# Patient Record
Sex: MALE | Race: WHITE
[De-identification: names, ages, dates, MRNs, and addresses within clinical notes are randomized per-mention and may not be internally consistent; named-entity substitution may affect disease eponyms.]

---

## 2019-11-27 ENCOUNTER — HOSPITAL ENCOUNTER (OUTPATIENT)
Dept: HOSPITAL 72 - SUR | Age: 74
Discharge: HOME | End: 2019-11-27
Payer: MEDICARE

## 2019-11-27 VITALS — SYSTOLIC BLOOD PRESSURE: 135 MMHG | DIASTOLIC BLOOD PRESSURE: 61 MMHG

## 2019-11-27 VITALS — DIASTOLIC BLOOD PRESSURE: 65 MMHG | SYSTOLIC BLOOD PRESSURE: 129 MMHG

## 2019-11-27 VITALS — SYSTOLIC BLOOD PRESSURE: 127 MMHG | DIASTOLIC BLOOD PRESSURE: 56 MMHG

## 2019-11-27 VITALS — DIASTOLIC BLOOD PRESSURE: 52 MMHG | SYSTOLIC BLOOD PRESSURE: 130 MMHG

## 2019-11-27 VITALS — HEIGHT: 63 IN | WEIGHT: 216 LBS | BODY MASS INDEX: 38.27 KG/M2

## 2019-11-27 VITALS — DIASTOLIC BLOOD PRESSURE: 58 MMHG | SYSTOLIC BLOOD PRESSURE: 118 MMHG

## 2019-11-27 VITALS — DIASTOLIC BLOOD PRESSURE: 58 MMHG | SYSTOLIC BLOOD PRESSURE: 136 MMHG

## 2019-11-27 VITALS — DIASTOLIC BLOOD PRESSURE: 58 MMHG | SYSTOLIC BLOOD PRESSURE: 120 MMHG

## 2019-11-27 VITALS — SYSTOLIC BLOOD PRESSURE: 122 MMHG | DIASTOLIC BLOOD PRESSURE: 71 MMHG

## 2019-11-27 VITALS — DIASTOLIC BLOOD PRESSURE: 82 MMHG | SYSTOLIC BLOOD PRESSURE: 117 MMHG

## 2019-11-27 DIAGNOSIS — I48.91: ICD-10-CM

## 2019-11-27 DIAGNOSIS — H25.811: Primary | ICD-10-CM

## 2019-11-27 DIAGNOSIS — E66.9: ICD-10-CM

## 2019-11-27 DIAGNOSIS — G47.33: ICD-10-CM

## 2019-11-27 DIAGNOSIS — I10: ICD-10-CM

## 2019-11-27 LAB
ADD MANUAL DIFF: NO
ANION GAP SERPL CALC-SCNC: 9 MMOL/L (ref 5–15)
APTT BLD: 31 SEC (ref 23–33)
BASOPHILS NFR BLD AUTO: 0.8 % (ref 0–2)
BUN SERPL-MCNC: 13 MG/DL (ref 7–18)
CALCIUM SERPL-MCNC: 8.5 MG/DL (ref 8.5–10.1)
CHLORIDE SERPL-SCNC: 108 MMOL/L (ref 98–107)
CO2 SERPL-SCNC: 25 MMOL/L (ref 21–32)
CREAT SERPL-MCNC: 0.9 MG/DL (ref 0.55–1.3)
EOSINOPHIL NFR BLD AUTO: 1.1 % (ref 0–3)
ERYTHROCYTE [DISTWIDTH] IN BLOOD BY AUTOMATED COUNT: 11.5 % (ref 11.6–14.8)
HCT VFR BLD CALC: 43.3 % (ref 42–52)
HGB BLD-MCNC: 14.8 G/DL (ref 14.2–18)
INR PPP: 1 (ref 0.9–1.1)
LYMPHOCYTES NFR BLD AUTO: 20.3 % (ref 20–45)
MCV RBC AUTO: 94 FL (ref 80–99)
MONOCYTES NFR BLD AUTO: 7 % (ref 1–10)
NEUTROPHILS NFR BLD AUTO: 70.8 % (ref 45–75)
PLATELET # BLD: 146 K/UL (ref 150–450)
POTASSIUM SERPL-SCNC: 4.4 MMOL/L (ref 3.5–5.1)
RBC # BLD AUTO: 4.59 M/UL (ref 4.7–6.1)
SODIUM SERPL-SCNC: 142 MMOL/L (ref 136–145)
WBC # BLD AUTO: 6.8 K/UL (ref 4.8–10.8)

## 2019-11-27 PROCEDURE — 82962 GLUCOSE BLOOD TEST: CPT

## 2019-11-27 PROCEDURE — 85610 PROTHROMBIN TIME: CPT

## 2019-11-27 PROCEDURE — 80048 BASIC METABOLIC PNL TOTAL CA: CPT

## 2019-11-27 PROCEDURE — 36415 COLL VENOUS BLD VENIPUNCTURE: CPT

## 2019-11-27 PROCEDURE — 85025 COMPLETE CBC W/AUTO DIFF WBC: CPT

## 2019-11-27 PROCEDURE — 85730 THROMBOPLASTIN TIME PARTIAL: CPT

## 2019-11-27 PROCEDURE — 66984 XCAPSL CTRC RMVL W/O ECP: CPT

## 2019-11-27 RX ADMIN — Medication SCH DROP: at 09:51

## 2019-11-27 RX ADMIN — CYCLOPENTOLATE HYDROCHLORIDE SCH DROP: 10 SOLUTION OPHTHALMIC at 09:51

## 2019-11-27 RX ADMIN — PHENYLEPHRINE HYDROCHLORIDE SCH DROP: 100 SOLUTION/ DROPS OPHTHALMIC at 09:37

## 2019-11-27 RX ADMIN — TOBRAMYCIN AND DEXAMETHASONE SCH DROP: 3; 1 SUSPENSION/ DROPS OPHTHALMIC at 09:37

## 2019-11-27 RX ADMIN — TOBRAMYCIN AND DEXAMETHASONE SCH DROP: 3; 1 SUSPENSION/ DROPS OPHTHALMIC at 09:52

## 2019-11-27 RX ADMIN — CIPROFLOXACIN HYDROCHLORIDE SCH DROP: 3 SOLUTION/ DROPS OPHTHALMIC at 09:36

## 2019-11-27 RX ADMIN — PHENYLEPHRINE HYDROCHLORIDE SCH DROP: 100 SOLUTION/ DROPS OPHTHALMIC at 10:11

## 2019-11-27 RX ADMIN — Medication SCH DROP: at 10:11

## 2019-11-27 RX ADMIN — CYCLOPENTOLATE HYDROCHLORIDE SCH DROP: 10 SOLUTION OPHTHALMIC at 10:11

## 2019-11-27 RX ADMIN — CIPROFLOXACIN HYDROCHLORIDE SCH DROP: 3 SOLUTION/ DROPS OPHTHALMIC at 10:10

## 2019-11-27 RX ADMIN — LIDOCAINE HYDROCHLORIDE SCH DROP: 35 GEL OPHTHALMIC at 09:36

## 2019-11-27 RX ADMIN — CIPROFLOXACIN HYDROCHLORIDE SCH DROP: 3 SOLUTION/ DROPS OPHTHALMIC at 09:51

## 2019-11-27 RX ADMIN — LIDOCAINE HYDROCHLORIDE SCH DROP: 35 GEL OPHTHALMIC at 09:51

## 2019-11-27 RX ADMIN — TOBRAMYCIN AND DEXAMETHASONE SCH DROP: 3; 1 SUSPENSION/ DROPS OPHTHALMIC at 10:11

## 2019-11-27 RX ADMIN — Medication SCH DROP: at 09:37

## 2019-11-27 RX ADMIN — PHENYLEPHRINE HYDROCHLORIDE SCH DROP: 100 SOLUTION/ DROPS OPHTHALMIC at 09:51

## 2019-11-27 RX ADMIN — CYCLOPENTOLATE HYDROCHLORIDE SCH DROP: 10 SOLUTION OPHTHALMIC at 09:37

## 2019-11-27 RX ADMIN — LIDOCAINE HYDROCHLORIDE SCH DROP: 35 GEL OPHTHALMIC at 10:10

## 2019-11-27 NOTE — DISCHARGE INSTRUCTIONS
Discharge Instructions


Discharge Instructions


Follow Up Orders


Wear shield at all times except to place eye drops


Continue preoperative eye drops


Followup with Dr Desir tomorrow in his office





For Congestive Heart Failure


Reminder


Report to your physician any weight gain of 5 pounds or more in one week.











Moody Desir MD Nov 27, 2019 11:47

## 2019-11-27 NOTE — ANETHESIA PREOPERATIVE EVAL
Anesthesia Pre-op PMH/ROS


General


Date of Evaluation:  Nov 27, 2019


Time of Evaluation:  10:42


Anesthesiologist:  Evelin


ASA Score:  ASA 3


Mallampati Score


Class I : Soft palate, uvula, fauces, pillars visible


Class II: Soft palate, uvula, fauces visible


Class III: Soft palate, base of uvula visible


Class IV: Only hard plate visible


Mallampati Classification:  Class III


Surgeon:  Karlee


Diagnosis:  R eye cataract


Surgical Procedure:  Cataract extaction


Anesthesia History:  none


Family History:  no anesthesia problems


Allergies:  


Coded Allergies:  


     No Known Allergies (Unverified , 11/27/19)


Medications:  see eMAR


Patient NPO?:  Yes





Past Medical History


Cardiovascular:  Reports: HTN - stable, arrhythmia - A fib; 


   Denies: CAD, MI, valve dz, other


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 


   Denies: asthma, COPD, other


Gastrointestinal/Genitourinary:  Reports: GERD; 


   Denies: CRI, ESRD, other


Neurologic/Psychiatric:  Denies: dementia, CVA, depression/anxiety, TIA, other


Endocrine:  Reports: DM - stable on pills; 


   Denies: hypothyroidism, steroids, other


HEENT:  Reports: cataract (L), cataract (R); 


   Denies: glaucoma, [place] (L), [place] (R), other


Hematology/Immune:  Denies: anemia, DVT, bleeding disorder, other


Musculoskeletal/Integumentary:  Denies: OA, RA, DJD, DDD, edema, other


Other:  obesity


PMH Narrative:


as above


PSxH Narrative:


See H&P





Anesthesia Pre-op Phys. Exam


Physician Exam





Last Vital Signs








  Date Time  Temp Pulse Resp B/P (MAP) Pulse Ox O2 Delivery O2 Flow Rate FiO2


 


11/27/19 09:56 97.6 56 20 122/71 98 Room Air  








Constitutional:  NAD


Neurologic:  CN 2-12 intact


Cardiovascular:  RRR, no M/R/G


Respiratory:  CTA


Gastrointestinal:  other - obesity





Airway Exam


Mallampati Score:  Class III


MO:  limited


Neck:  short


ROM:  limited


Teeth:  missing


Dentures:  no upper, no lower





Anesthesia Pre-op A/P


Labs





Hematology








Test


  11/27/19


10:10


 


White Blood Count


  6.8 K/UL


(4.8-10.8)


 


Red Blood Count


  4.59 M/UL


(4.70-6.10)  L


 


Hemoglobin


  14.8 G/DL


(14.2-18.0)


 


Hematocrit


  43.3 %


(42.0-52.0)


 


Mean Corpuscular Volume 94 FL (80-99)  


 


Mean Corpuscular Hemoglobin


  32.2 PG


(27.0-31.0)  H


 


Mean Corpuscular Hemoglobin


Concent 34.2 G/DL


(32.0-36.0)


 


Red Cell Distribution Width


  11.5 %


(11.6-14.8)  L


 


Platelet Count


  146 K/UL


(150-450)  L


 


Mean Platelet Volume


  8.2 FL


(6.5-10.1)


 


Neutrophils (%) (Auto)


  70.8 %


(45.0-75.0)


 


Lymphocytes (%) (Auto)


  20.3 %


(20.0-45.0)


 


Monocytes (%) (Auto)


  7.0 %


(1.0-10.0)


 


Eosinophils (%) (Auto)


  1.1 %


(0.0-3.0)


 


Basophils (%) (Auto)


  0.8 %


(0.0-2.0)








Coagulation








Test


  11/27/19


10:10


 


Prothrombin Time Pending  


 


Prothromb Time International


Ratio Pending  


 


 


Activated Partial


Thromboplast Time Pending  


 








Chemistry








Test


  11/27/19


10:10


 


Sodium Level


  142 MMOL/L


(136-145)


 


Potassium Level


  4.4 MMOL/L


(3.5-5.1)


 


Chloride Level


  108 MMOL/L


()  H


 


Carbon Dioxide Level


  25 MMOL/L


(21-32)


 


Anion Gap


  9 mmol/L


(5-15)


 


Blood Urea Nitrogen


  13 mg/dL


(7-18)


 


Creatinine


  0.9 MG/DL


(0.55-1.30)


 


Estimat Glomerular Filtration


Rate  mL/min (>60)  


 


 


Glucose Level


  157 MG/DL


()  H


 


Calcium Level


  8.5 MG/DL


(8.5-10.1)











Studies


Pre-op Studies:  EKG - R





Risk Assessment & Plan


Assessment:


ASA 3


Plan:


MAC


Status Change Before Surgery:  Sukumar Fenton MD Nov 27, 2019 10:45

## 2019-11-27 NOTE — PRE-PROCEDURE NOTE/ATTESTATION
Pre-Procedure Note/Attestation


Complete Prior to Procedure


Planned Procedure:  right - Removal of cataract and placement of intraocular 

lens, right eye


Procedure Narrative:


Removal of cataract and placement of intraocular lens, right eye





Indications for Procedure


Pre-Operative Diagnosis:


cataract, combined, right eye





Attestation


I attest that I discussed the nature of the procedure; its benefits; risks and 

complications; and alternatives (and the risks and benefits of such alternatives

), prior to the procedure, with the patient (or the patient's legal 

representative).





I attest that, if there was a reasonable possibility of needing a blood 

transfusion, the patient (or the patient's legal representative) was given the 

[place] Department [employer] standardized written summary, pursuant 

to the [place] Blood Safety Act ([place] Health and Safety Code # 1645, as 

amended).





I attest that I re-evaluated the patient just prior to the surgery and that 

there has been no change in the patient's H&P, except as documented below:











Moody Desir MD Nov 27, 2019 09:08

## 2019-11-27 NOTE — BRIEF OPERATIVE NOTE
Immediate Post Operative Note


Operative Note


Pre-op Diagnosis:


cataract, combined, right eye


Procedure:


Phaco PC IOL OD


Use of Malyugen Ring (7.0)


Post-op Diagnosis:


Cataract, combined, OD


Miosis (H/O Flomax use)


Floppy iris syndrome


Post-op Diagnosis:  same as pre-op plus


Surgeon:  JOSE ROBERTO Desir MD MS


Additional Surgeons:  none


Anesthesiologist:  Dr Waters


Anesthesia:  local, MAC


Specimen:  none


Complications:  none


Fluids:  see chart


Implant(s) used?:  Yes - walt SN 60 WF 19.5











Moody Desir MD Nov 27, 2019 11:51

## 2019-11-27 NOTE — OPERATIVE NOTE - DICTATED
DATE OF OPERATION:  11/27/2019

SURGEON:  Moody Desir M.D.



ASSISTANT SURGEON:  None.



ANESTHESIOLOGIST:  Sukumar Waters M.D.



ANESTHESIA:  Local/standby/monitored anesthesia care.



PREOPERATIVE DIAGNOSES:

1. Cataract, combined, right eye.

2. Miosis, right eye.



POSTOPERATIVE DIAGNOSES:

1. Cataract, combined, right eye.

2. Myosis, right eye.

3. Floppy iris syndrome, right eye.



PROCEDURES:

1. Phacoemulsification of cataract, right eye.

2. Placement of posterior chamber intraocular lens, right eye (model

Hay, AcrySof, [id number], power 19.5 diopters).

3. Use of Malyugin ring (7.0 mm).



COMPLICATIONS:  None.



SPECIMENS:  None.



INDICATIONS FOR SURGERY:  The patient has had the painless progressive

decrease in visual acuity in the right eye secondary to cataract.  The

patient understands the risk of surgery including infection, bleeding,

need for further surgery, loss of vision, no improvement in vision, loss

of the eye, loss of life, glaucoma, and retinal detachment, understands

these risks and elects to proceed with surgery.



FINDINGS:  The patient had a +4 nuclear sclerotic cataract as well as +2

cortical cataract.



OPERATIVE NOTE:  After informed consent was obtained, the patient was

brought into the operating room, placed in supine position.  Cardiac and

respiratory monitors were attached.  A time-out was performed and all

criteria were met and everyone in the room agreed.  The right eye was then

draped and prepped in a sterile manner for ocular surgery.  A lid speculum

was placed in the eye.  A 1% lidocaine preservative-free was injected at

the approximate 9 o'clock limbus.  A conjunctiva peritomy from

approximately 8:30 to 9:30 was made and dissected posteriorly.  Hemostasis

was maintained with bipolar cautery.  A. 2.6 mm limbal incision was made

centered approximately 9 o'clock and dissected anteriorly.  A paracentesis

was made at approximately 12 o'clock and Shugarcaine was injected into the

anterior chamber followed by Ranulfo.  The anterior chamber was then

entered using a 2.6 mm keratome through the limbal incision.  The pupil

was already noted to be only approximately 4 mm and floppy in nature, and

a Malyugin ring, 7.0 mm was injected into the anterior chamber and hooked

onto the pupillary margin at four points.  An anterior capsulorrhexis was

then performed.  Hydrodissection and hydrodelineation of the lens was then

performed.  The lens was then phacoemulsified using divide and conquer

four-quadrant technique.  Residual cortical material was then aspirated.

The lens was taken from its package, placed into the cartridge and Healon

was injected into the anterior chamber and capsular bag after the cortex

had been previously removed with an I/A tip.  The tip of the cartridge was

placed through the limbal incision.  The lens was injected into the

capsular bag and centered nicely with a Sinskey hook.  The Malyugin ring

was then removed.  The Healon was then aspirated from the anterior chamber

and capsular bag.  One 10-0 nylon interrupted suture was then placed

through the limbal incision, the knot was rotated and buried.  The wounds

were hydrated, closed also, and the wounds were checked and found to be

watertight.  The conjunctiva was then closed with forceps cautery.  The

lid speculum and drapes were removed from the eye and drops of Pred Forte,

TobraDex, and ciprofloxacin were applied to the eye followed by Maxitrol

ointment and a shield.  The patient tolerated the procedure well and left

the operating room awake, alert, and in stable condition.









  ______________________________________________

  Moody Desir M.D.





DR:  DEWEY

D:  11/27/2019 11:59

T:  11/27/2019 16:35

JOB#:  [id number]

CC:

## 2019-11-27 NOTE — 48 HOUR POST ANESTHESIA EVAL
Post Anesthesia Evaluation


Procedure:  R eye cataract extraction with iol


Date of Evaluation:  Nov 27, 2019


Time of Evaluation:  12:32


Blood Pressure Systolic:  128


0:  77


Pulse Rate:  62


Respiratory Rate:  18


Temperature (Fahrenheit):  97.6


O2 Sat by Pulse Oximetry:  98


Airway:  patent


Nausea:  No


Vomiting:  No


Pain Intensity:  1


Hydration Status:  adequate


Cardiopulmonary Status:


stable


Mental Status/LOC:  patient returned to baseline


Follow-up Care/Observations:


n/a


Post-Anesthesia Complications:


none


Follow-up care needed:  ready to discharge











Sukumar Waters MD Nov 27, 2019 12:33